# Patient Record
Sex: FEMALE | Race: WHITE | Employment: UNEMPLOYED | URBAN - METROPOLITAN AREA
[De-identification: names, ages, dates, MRNs, and addresses within clinical notes are randomized per-mention and may not be internally consistent; named-entity substitution may affect disease eponyms.]

---

## 2017-01-05 NOTE — TELEPHONE ENCOUNTER
Pt requesting refill, okay to  at either location. HYDROcodone-acetaminophen 5-325 MG Oral Tab Take 1 tablet by mouth every 8 (eight) hours as needed for Pain.  Disp: 60 tablet Rfl: 0

## 2017-01-09 RX ORDER — HYDROCODONE BITARTRATE AND ACETAMINOPHEN 5; 325 MG/1; MG/1
1 TABLET ORAL EVERY 8 HOURS PRN
Qty: 60 TABLET | Refills: 0 | Status: SHIPPED | OUTPATIENT
Start: 2017-01-09 | End: 2017-02-06

## 2017-01-09 NOTE — TELEPHONE ENCOUNTER
Phone call to patient.  Voice message left that script for Hydrocodone is ready for p/u at [address] placed in file [place] at [facility].

## 2017-02-03 NOTE — TELEPHONE ENCOUNTER
Pt requesting refill      Current Outpatient Prescriptions:  HYDROcodone-acetaminophen 5-325 MG Oral Tab Take 1 tablet by mouth every 8 (eight) hours as needed for Pain.  Disp: 60 tablet Rfl: 0

## 2017-02-06 RX ORDER — HYDROCODONE BITARTRATE AND ACETAMINOPHEN 5; 325 MG/1; MG/1
1 TABLET ORAL EVERY 8 HOURS PRN
Qty: 60 TABLET | Refills: 0 | Status: SHIPPED | OUTPATIENT
Start: 2017-02-06 | End: 2017-03-07

## 2017-02-06 NOTE — TELEPHONE ENCOUNTER
Please advise on refill request.     Recent Visits       Provider Department Primary Dx    3 months ago Yvon Delvalle MD [facility] [place], [facility], [place] Routine general medical examination at a health care facility    1 year ago Jessica [place]

## 2017-03-06 NOTE — TELEPHONE ENCOUNTER
Pt requesting refill for medication.  Please advise      Current outpatient prescriptions:   •  HYDROcodone-acetaminophen 5-325 MG Oral Tab, Take 1 tablet by mouth every 8 (eight) hours as needed for Pain., Disp: 60 tablet, Rfl: 0

## 2017-03-08 RX ORDER — HYDROCODONE BITARTRATE AND ACETAMINOPHEN 5; 325 MG/1; MG/1
1 TABLET ORAL EVERY 8 HOURS PRN
Qty: 60 TABLET | Refills: 0 | OUTPATIENT
Start: 2017-03-08 | End: 2017-03-10

## 2017-03-10 RX ORDER — HYDROCODONE BITARTRATE AND ACETAMINOPHEN 5; 325 MG/1; MG/1
1 TABLET ORAL EVERY 8 HOURS PRN
Qty: 60 TABLET | Refills: 0 | Status: SHIPPED | OUTPATIENT
Start: 2017-03-10 | End: 2017-04-07

## 2017-03-10 NOTE — TELEPHONE ENCOUNTER
Patient called and informed Reida Situ Rx is ready for  at [facility] location in [place] 3rd floor. Patient verbalized understanding with no further questions noted.

## 2017-04-04 NOTE — TELEPHONE ENCOUNTER
Pt requesting refill, stts she can  script at either location.          HYDROcodone-acetaminophen 5-325 MG Oral Tab

## 2017-04-07 ENCOUNTER — TELEPHONE (OUTPATIENT)
Dept: INTERNAL MEDICINE CLINIC | Facility: CLINIC | Age: 32
End: 2017-04-07

## 2017-04-07 RX ORDER — HYDROCODONE BITARTRATE AND ACETAMINOPHEN 5; 325 MG/1; MG/1
1 TABLET ORAL EVERY 8 HOURS PRN
Qty: 60 TABLET | Refills: 0 | Status: SHIPPED | OUTPATIENT
Start: 2017-04-07 | End: 2017-05-10

## 2017-04-07 NOTE — TELEPHONE ENCOUNTER
Left message that script is ready to be picked up at [address] office third floor. Placed at .

## 2017-04-07 NOTE — TELEPHONE ENCOUNTER
Dr. Luisa Hsu, Rx request for Hydrocodone-acetaminophen 5-325 mg, UNABLE to fill per protocol. Last refill: 3/10/17 #60 with 0 refills. Please review. Thank you.     Refill Protocol Appointment Criteria  · Appointment scheduled in the past 6 months or in the

## 2017-05-03 NOTE — TELEPHONE ENCOUNTER
Pt requesting refill.   stts can  script at either location. HYDROcodone-acetaminophen 5-325 MG Oral Tab Take 1 tablet by mouth every 8 (eight) hours as needed for Pain.  Disp: 60 tablet Rfl: 0

## 2017-05-08 NOTE — TELEPHONE ENCOUNTER
Pt is calling for status of her medication refill request. Pt states that she is out of medication and can be reached at [phone].

## 2017-05-10 NOTE — TELEPHONE ENCOUNTER
Dr. Shasha Maciel, please see pended medication and advise. Pt is out of medication.     LOV: 11/8/16  Last prescribed: 4/7/17 for 60 tabs/0 refills

## 2017-05-12 RX ORDER — HYDROCODONE BITARTRATE AND ACETAMINOPHEN 5; 325 MG/1; MG/1
1 TABLET ORAL EVERY 8 HOURS PRN
Qty: 60 TABLET | Refills: 0 | Status: SHIPPED | OUTPATIENT
Start: 2017-05-12 | End: 2017-06-05

## 2017-05-12 NOTE — TELEPHONE ENCOUNTER
Patient called and informed Rx is ready for  at [facility] location in [place] at 3rd floor . Patient verbalized understanding with no further questions noted.

## 2017-06-05 RX ORDER — HYDROCODONE BITARTRATE AND ACETAMINOPHEN 5; 325 MG/1; MG/1
1 TABLET ORAL EVERY 8 HOURS PRN
Qty: 60 TABLET | Refills: 0 | Status: SHIPPED | OUTPATIENT
Start: 2017-06-05 | End: 2017-07-06

## 2017-07-06 NOTE — TELEPHONE ENCOUNTER
Pt requesting refill. Pt stts she can  script at either location. HYDROcodone-acetaminophen 5-325 MG Oral Tab Take 1 tablet by mouth every 8 (eight) hours as needed for Pain.  Disp: 60 tablet Rfl: 0

## 2017-07-07 RX ORDER — HYDROCODONE BITARTRATE AND ACETAMINOPHEN 5; 325 MG/1; MG/1
1 TABLET ORAL EVERY 8 HOURS PRN
Qty: 60 TABLET | Refills: 0 | Status: SHIPPED | OUTPATIENT
Start: 2017-07-07 | End: 2017-08-07

## 2017-07-07 NOTE — TELEPHONE ENCOUNTER
Spoke to Pt , informed Norco prescription is ready at the  . [facility] location .

## 2017-08-10 RX ORDER — HYDROCODONE BITARTRATE AND ACETAMINOPHEN 5; 325 MG/1; MG/1
1 TABLET ORAL EVERY 8 HOURS PRN
Qty: 60 TABLET | Refills: 0 | Status: SHIPPED | OUTPATIENT
Start: 2017-08-10 | End: 2017-09-07

## 2017-08-10 NOTE — TELEPHONE ENCOUNTER
Spoke to Pt informed [place] rx is ready at the [facility]. Pt verbalized understanding denied questions .

## 2017-09-05 NOTE — TELEPHONE ENCOUNTER
Pt would like a refill on hydrocodone medication. Please, call pt when the script is ready for pickup at either location.        Current Outpatient Prescriptions:  HYDROcodone-acetaminophen 5-325 MG Oral Tab Take 1 tablet by mouth every 8 (eight) hours as n

## 2017-09-07 RX ORDER — HYDROCODONE BITARTRATE AND ACETAMINOPHEN 5; 325 MG/1; MG/1
1 TABLET ORAL EVERY 8 HOURS PRN
Qty: 60 TABLET | Refills: 0 | Status: SHIPPED | OUTPATIENT
Start: 2017-09-07 | End: 2017-10-06

## 2017-09-07 NOTE — TELEPHONE ENCOUNTER
Requesting Hydrocodone-acetaminophen refill    Last filled 8/10/17  Last OV 11/8/16    Med pended for review, please advise

## 2017-10-06 RX ORDER — HYDROCODONE BITARTRATE AND ACETAMINOPHEN 5; 325 MG/1; MG/1
1 TABLET ORAL EVERY 8 HOURS PRN
Qty: 60 TABLET | Refills: 0 | Status: SHIPPED | OUTPATIENT
Start: 2017-10-06 | End: 2017-10-07

## 2017-10-06 NOTE — TELEPHONE ENCOUNTER
Pt stts she is out of medication, pt stts she can  script at either location.      Pt stts she is trying to find an new PCP due to insurance,         Current Outpatient Prescriptions:  HYDROcodone-acetaminophen 5-325 MG Oral Tab Take 1 tablet by mout

## 2017-10-06 NOTE — TELEPHONE ENCOUNTER
Dr. Kevin Echeverria, please see pended medication and advise.      LOV: 11/8/16  Last prescribed: 9/7/17 for 60 tabs/0 refills

## 2017-10-07 NOTE — TELEPHONE ENCOUNTER
Please sign and print when you are at [address]. Rx was printed at [facility] by accident. Rx pended.

## 2017-10-10 RX ORDER — HYDROCODONE BITARTRATE AND ACETAMINOPHEN 5; 325 MG/1; MG/1
1 TABLET ORAL EVERY 8 HOURS PRN
Qty: 60 TABLET | Refills: 0 | Status: SHIPPED | OUTPATIENT
Start: 2017-10-10 | End: 2017-12-22

## 2017-12-21 NOTE — TELEPHONE ENCOUNTER
Pt calling states she has been unable to switch her care as of yet to new PCP due to having illinicare. Pt requesting refill, pt is out of meds. Please advise.          Current Outpatient Prescriptions:  HYDROcodone-acetaminophen 5-325 MG Oral Tab Take 1 ta

## 2017-12-22 RX ORDER — HYDROCODONE BITARTRATE AND ACETAMINOPHEN 5; 325 MG/1; MG/1
1 TABLET ORAL EVERY 8 HOURS PRN
Qty: 60 TABLET | Refills: 0 | Status: SHIPPED | OUTPATIENT
Start: 2017-12-22 | End: 2017-12-29

## 2017-12-28 NOTE — TELEPHONE ENCOUNTER
Pt called to follow up. Script was approved on 12/22  Is script ready for ? Which location? Please advise.

## 2017-12-29 RX ORDER — HYDROCODONE BITARTRATE AND ACETAMINOPHEN 5; 325 MG/1; MG/1
1 TABLET ORAL EVERY 8 HOURS PRN
Qty: 60 TABLET | Refills: 0 | Status: SHIPPED | OUTPATIENT
Start: 2017-12-29 | End: 2018-04-04

## 2017-12-29 NOTE — TELEPHONE ENCOUNTER
Rx signed by Dr Miguelina Menendez . Called Pt to inform is at the [facility] location . Pt has [employer] insurance will find PCP to continue to fill meds .

## 2017-12-29 NOTE — TELEPHONE ENCOUNTER
Pt called last night stating her rx was ready for  but she did not know where to get her rx. Pt called today to follow up.     -called on sight nurse at [facility], rn stated that the rx was not there and Dr. Shruthi Aiken  On vacation.  Send an encounter.     - molina

## 2018-02-23 NOTE — TELEPHONE ENCOUNTER
Pt requesting refill for     Current Outpatient Prescriptions:  HYDROcodone-acetaminophen 5-325 MG Oral Tab Take 1 tablet by mouth every 8 (eight) hours as needed for Pain.  Disp: 60 tablet Rfl: 0       Pt states she still has not found a new pcp who takes her insurance, Please advise thank you

## 2018-02-27 NOTE — TELEPHONE ENCOUNTER
I inquired if pt was assisted by insurance to find another PCP. She states that she hasn't found one yet that will be able to take care of all of her needs. Pt states that Dr. Zaynab Daugherty did refill this rx once before since her insurance changed. Pt knows she would have to pay for this rx out of pocket. Please advise.

## 2018-02-28 RX ORDER — HYDROCODONE BITARTRATE AND ACETAMINOPHEN 5; 325 MG/1; MG/1
1 TABLET ORAL EVERY 8 HOURS PRN
Qty: 60 TABLET | Refills: 0 | OUTPATIENT
Start: 2018-02-28

## 2018-03-01 RX ORDER — HYDROCODONE BITARTRATE AND ACETAMINOPHEN 5; 325 MG/1; MG/1
1 TABLET ORAL EVERY 8 HOURS PRN
Qty: 60 TABLET | Refills: 0 | OUTPATIENT
Start: 2018-03-01

## 2018-03-02 RX ORDER — HYDROCODONE BITARTRATE AND ACETAMINOPHEN 5; 325 MG/1; MG/1
1 TABLET ORAL EVERY 8 HOURS PRN
Qty: 60 TABLET | Refills: 0 | OUTPATIENT
Start: 2018-03-02

## 2018-03-02 NOTE — TELEPHONE ENCOUNTER
Patient is calling back to update her insurance information - will be in effect 3/31/18    Patient is asking if you could fill Rx x 1 until she is able to come and see you

## 2018-03-02 NOTE — TELEPHONE ENCOUNTER
FYI: pt called, told pt to make appt due to [address] was 2016, pt understood, she will call back.

## 2018-04-04 ENCOUNTER — OFFICE VISIT (OUTPATIENT)
Dept: INTERNAL MEDICINE CLINIC | Facility: CLINIC | Age: 33
End: 2018-04-04

## 2018-04-04 VITALS
WEIGHT: 140 LBS | HEIGHT: 67 IN | DIASTOLIC BLOOD PRESSURE: 77 MMHG | HEART RATE: 105 BPM | BODY MASS INDEX: 21.97 KG/M2 | TEMPERATURE: 99 F | SYSTOLIC BLOOD PRESSURE: 123 MMHG

## 2018-04-04 DIAGNOSIS — Z00.00 ROUTINE GENERAL MEDICAL EXAMINATION AT A HEALTH CARE FACILITY: Primary | ICD-10-CM

## 2018-04-04 DIAGNOSIS — M54.31 SCIATICA OF RIGHT SIDE: ICD-10-CM

## 2018-04-04 PROCEDURE — 99395 PREV VISIT EST AGE 18-39: CPT | Performed by: INTERNAL MEDICINE

## 2018-04-04 RX ORDER — HYDROCODONE BITARTRATE AND ACETAMINOPHEN 5; 325 MG/1; MG/1
1 TABLET ORAL EVERY 8 HOURS PRN
Qty: 60 TABLET | Refills: 0 | Status: ON HOLD | OUTPATIENT
Start: 2018-04-04 | End: 2019-04-09

## 2018-04-05 NOTE — PROGRESS NOTES
HPI:    Patient ID: Gina Rubin is a 27year old female.     HPI    Physical exam    onging dental procedurel   Diffuse dental carries missing teeth  Planned gradual  Tooth extraction  And dentures with   Posts for support dental implran    chornic low Allergies:  Methocarbamol           Bleeding    HISTORY:  Past Medical History:   Diagnosis Date   • Abnormal Pap smear of cervix 2007    LGSIL    • Migraine headache 1995    OTC meds   • Pregnancy 2002, 2002   • Pregnancy 2012    Vaginal vacuum   • Se Comments: . Breast exam.  Bilateral symmetric  No discrete palpable masses or tenderness  No nipple discharge  And no axillary adenopathy. Musculoskeletal: She exhibits no edema or tenderness. Lymphadenopathy:     She has no cervical adenopathy.    Ella

## 2018-10-30 ENCOUNTER — OFFICE VISIT (OUTPATIENT)
Dept: INTERNAL MEDICINE CLINIC | Facility: CLINIC | Age: 33
End: 2018-10-30
Payer: MEDICAID

## 2018-10-30 VITALS
HEIGHT: 67 IN | TEMPERATURE: 99 F | BODY MASS INDEX: 24.01 KG/M2 | HEART RATE: 98 BPM | WEIGHT: 153 LBS | SYSTOLIC BLOOD PRESSURE: 126 MMHG | DIASTOLIC BLOOD PRESSURE: 77 MMHG

## 2018-10-30 DIAGNOSIS — Z34.91 PREGNANT AND NOT YET DELIVERED IN FIRST TRIMESTER: ICD-10-CM

## 2018-10-30 DIAGNOSIS — N92.6 IRREGULAR PERIODS: Primary | ICD-10-CM

## 2018-10-30 DIAGNOSIS — K02.9 DENTAL CARIES: ICD-10-CM

## 2018-10-30 DIAGNOSIS — Z72.0 TOBACCO ABUSE: ICD-10-CM

## 2018-10-30 PROCEDURE — 90471 IMMUNIZATION ADMIN: CPT | Performed by: INTERNAL MEDICINE

## 2018-10-30 PROCEDURE — 90686 IIV4 VACC NO PRSV 0.5 ML IM: CPT | Performed by: INTERNAL MEDICINE

## 2018-10-30 PROCEDURE — 99212 OFFICE O/P EST SF 10 MIN: CPT | Performed by: INTERNAL MEDICINE

## 2018-10-30 PROCEDURE — 99214 OFFICE O/P EST MOD 30 MIN: CPT | Performed by: INTERNAL MEDICINE

## 2018-10-30 PROCEDURE — 81025 URINE PREGNANCY TEST: CPT | Performed by: INTERNAL MEDICINE

## 2018-10-30 RX ORDER — ACETAMINOPHEN, ASPIRIN AND CAFFEINE 250; 250; 65 MG/1; MG/1; MG/1
1 TABLET, FILM COATED ORAL EVERY 6 HOURS PRN
Status: ON HOLD | COMMUNITY
End: 2019-04-09

## 2018-10-30 RX ORDER — OMEGA-3 FATTY ACIDS/FISH OIL 300-1000MG
CAPSULE ORAL
Status: ON HOLD | COMMUNITY
End: 2019-04-09

## 2018-10-30 RX ORDER — DIPHENHYDRAMINE HCL 25 MG
25 TABLET ORAL EVERY 6 HOURS PRN
Status: ON HOLD | COMMUNITY
End: 2019-04-09

## 2018-10-30 NOTE — PROGRESS NOTES
HPI:    Patient ID: Alphonso Mijares is a 27year old female.     HPI    Patient is here for follow-up she wants to confirm her pregnancy last menstrual period was July 2018  She has dental caries upper and lower teeth and all of them needs extractions is pl Vaginal vacuum   • Seasonal allergies     Clariton   • Varicella zoster 1988      Past Surgical History:   Procedure Laterality Date   • COLONOSCOPY,BIOPSY  2007    LGSIL + HPV      Family History   Problem Relation Age of Onset   • Hypertension Brother 25 (N92.6) Irregular periods  (primary encounter diagnosis)  Plan: URINE PREGNANCY TEST, HCG, BETA SUBUNIT, QUAL        Pt sure she is pregnant  Referral given    (Z34.91) Pregnant and not yet delivered in first trimester  Plan: OBG - INTERNAL        As abo

## 2019-04-05 ENCOUNTER — TELEPHONE (OUTPATIENT)
Dept: OBGYN CLINIC | Facility: CLINIC | Age: 34
End: 2019-04-05

## 2019-04-05 NOTE — TELEPHONE ENCOUNTER
Pt's LMP was 7/19/18. Based on this, pt is 35w3d. Pt has not had any prenatal care. She states she did not have any insurance and could not pay for the appts. She states she did see her pcp in 10/2018, but that is the only appt she has had.   Pt informe

## 2019-04-07 ENCOUNTER — ANESTHESIA (OUTPATIENT)
Dept: OBGYN UNIT | Facility: HOSPITAL | Age: 34
End: 2019-04-07
Payer: MEDICAID

## 2019-04-07 ENCOUNTER — APPOINTMENT (OUTPATIENT)
Dept: ULTRASOUND IMAGING | Facility: HOSPITAL | Age: 34
End: 2019-04-07
Attending: OBSTETRICS & GYNECOLOGY
Payer: MEDICAID

## 2019-04-07 ENCOUNTER — HOSPITAL ENCOUNTER (INPATIENT)
Facility: HOSPITAL | Age: 34
LOS: 2 days | Discharge: HOME | End: 2019-04-09
Attending: OBSTETRICS & GYNECOLOGY | Admitting: OBSTETRICS & GYNECOLOGY
Payer: MEDICAID

## 2019-04-07 ENCOUNTER — ANESTHESIA EVENT (OUTPATIENT)
Dept: OBGYN UNIT | Facility: HOSPITAL | Age: 34
End: 2019-04-07
Payer: MEDICAID

## 2019-04-07 PROBLEM — Z34.90 PREGNANCY: Status: ACTIVE | Noted: 2019-04-07

## 2019-04-07 PROBLEM — O09.33 NO PRENATAL CARE IN CURRENT PREGNANCY IN THIRD TRIMESTER: Status: ACTIVE | Noted: 2019-04-07

## 2019-04-07 PROCEDURE — 59409 OBSTETRICAL CARE: CPT | Performed by: OBSTETRICS & GYNECOLOGY

## 2019-04-07 PROCEDURE — 76816 OB US FOLLOW-UP PER FETUS: CPT | Performed by: OBSTETRICS & GYNECOLOGY

## 2019-04-07 PROCEDURE — 59025 FETAL NON-STRESS TEST: CPT | Performed by: OBSTETRICS & GYNECOLOGY

## 2019-04-07 RX ORDER — EPHEDRINE SULFATE/0.9% NACL/PF 25 MG/5 ML
5 SYRINGE (ML) INTRAVENOUS AS NEEDED
Status: DISCONTINUED | OUTPATIENT
Start: 2019-04-07 | End: 2019-04-09

## 2019-04-07 RX ORDER — TERBUTALINE SULFATE 1 MG/ML
0.25 INJECTION, SOLUTION SUBCUTANEOUS AS NEEDED
Status: DISCONTINUED | OUTPATIENT
Start: 2019-04-07 | End: 2019-04-08

## 2019-04-07 RX ORDER — METHYLERGONOVINE MALEATE 0.2 MG/ML
INJECTION INTRAVENOUS
Status: COMPLETED
Start: 2019-04-07 | End: 2019-04-07

## 2019-04-07 RX ORDER — DEXTROSE, SODIUM CHLORIDE, SODIUM LACTATE, POTASSIUM CHLORIDE, AND CALCIUM CHLORIDE 5; .6; .31; .03; .02 G/100ML; G/100ML; G/100ML; G/100ML; G/100ML
INJECTION, SOLUTION INTRAVENOUS CONTINUOUS
Status: DISCONTINUED | OUTPATIENT
Start: 2019-04-07 | End: 2019-04-08

## 2019-04-07 RX ORDER — CARBOPROST TROMETHAMINE 250 UG/ML
INJECTION, SOLUTION INTRAMUSCULAR
Status: DISCONTINUED
Start: 2019-04-07 | End: 2019-04-07 | Stop reason: WASHOUT

## 2019-04-07 RX ORDER — AMMONIA INHALANTS 0.04 G/.3ML
0.3 INHALANT RESPIRATORY (INHALATION) AS NEEDED
Status: DISCONTINUED | OUTPATIENT
Start: 2019-04-07 | End: 2019-04-08

## 2019-04-07 RX ORDER — MISOPROSTOL 200 UG/1
TABLET ORAL
Status: COMPLETED
Start: 2019-04-07 | End: 2019-04-07

## 2019-04-07 RX ORDER — BUPIVACAINE HYDROCHLORIDE 2.5 MG/ML
INJECTION, SOLUTION EPIDURAL; INFILTRATION; INTRACAUDAL
Status: DISPENSED
Start: 2019-04-07 | End: 2019-04-08

## 2019-04-07 RX ORDER — BUPIVACAINE HYDROCHLORIDE 2.5 MG/ML
15 INJECTION, SOLUTION EPIDURAL; INFILTRATION; INTRACAUDAL ONCE
Status: COMPLETED | OUTPATIENT
Start: 2019-04-07 | End: 2019-04-07

## 2019-04-07 RX ORDER — NALBUPHINE HCL 10 MG/ML
2.5 AMPUL (ML) INJECTION
Status: DISCONTINUED | OUTPATIENT
Start: 2019-04-07 | End: 2019-04-09

## 2019-04-07 RX ORDER — IBUPROFEN 600 MG/1
600 TABLET ORAL ONCE AS NEEDED
Status: DISCONTINUED | OUTPATIENT
Start: 2019-04-07 | End: 2019-04-09

## 2019-04-07 RX ORDER — SODIUM CHLORIDE, SODIUM LACTATE, POTASSIUM CHLORIDE, CALCIUM CHLORIDE 600; 310; 30; 20 MG/100ML; MG/100ML; MG/100ML; MG/100ML
INJECTION, SOLUTION INTRAVENOUS CONTINUOUS
Status: DISCONTINUED | OUTPATIENT
Start: 2019-04-07 | End: 2019-04-08

## 2019-04-07 RX ORDER — LIDOCAINE HYDROCHLORIDE 10 MG/ML
30 INJECTION, SOLUTION EPIDURAL; INFILTRATION; INTRACAUDAL; PERINEURAL ONCE
Status: DISCONTINUED | OUTPATIENT
Start: 2019-04-07 | End: 2019-04-08

## 2019-04-07 RX ORDER — LIDOCAINE HYDROCHLORIDE AND EPINEPHRINE 15; 5 MG/ML; UG/ML
INJECTION, SOLUTION EPIDURAL AS NEEDED
Status: DISCONTINUED | OUTPATIENT
Start: 2019-04-07 | End: 2019-04-07 | Stop reason: SURG

## 2019-04-07 RX ORDER — SODIUM CHLORIDE 0.9 % (FLUSH) 0.9 %
10 SYRINGE (ML) INJECTION AS NEEDED
Status: DISCONTINUED | OUTPATIENT
Start: 2019-04-07 | End: 2019-04-08

## 2019-04-07 RX ORDER — TRISODIUM CITRATE DIHYDRATE AND CITRIC ACID MONOHYDRATE 500; 334 MG/5ML; MG/5ML
30 SOLUTION ORAL AS NEEDED
Status: DISCONTINUED | OUTPATIENT
Start: 2019-04-07 | End: 2019-04-08

## 2019-04-07 RX ORDER — LIDOCAINE HYDROCHLORIDE 10 MG/ML
INJECTION, SOLUTION EPIDURAL; INFILTRATION; INTRACAUDAL; PERINEURAL AS NEEDED
Status: DISCONTINUED | OUTPATIENT
Start: 2019-04-07 | End: 2019-04-07 | Stop reason: SURG

## 2019-04-07 RX ADMIN — LIDOCAINE HYDROCHLORIDE AND EPINEPHRINE 5 ML: 15; 5 INJECTION, SOLUTION EPIDURAL at 17:15:00

## 2019-04-07 RX ADMIN — BUPIVACAINE HYDROCHLORIDE 10 ML: 2.5 INJECTION, SOLUTION EPIDURAL; INFILTRATION; INTRACAUDAL at 17:17:00

## 2019-04-07 RX ADMIN — LIDOCAINE HYDROCHLORIDE 3 ML: 10 INJECTION, SOLUTION EPIDURAL; INFILTRATION; INTRACAUDAL; PERINEURAL at 17:12:00

## 2019-04-07 NOTE — PROGRESS NOTES
Pt is a 26year old female admitted to TR1/TR1-A. Patient presents with:  Rupture Of Membranes: srom 1230, clear mucousy     Pt is  38w1d intra-uterine pregnancy. History obtained, consents signed. Oriented to room, staff, and plan of care.

## 2019-04-07 NOTE — ANESTHESIA PREPROCEDURE EVALUATION
Anesthesia PreOp Note    HPI:     Lorraine Christian is a 26year old female who presents for preoperative consultation requested by: * No surgeons listed *    Date of Surgery: 4/7/2019    * No procedures listed *  Indication: * No pre-op diagnosis entered * 5 % /lactated ringers infusion  Intravenous Continuous Hilton Jauregui MD     Lidocaine HCl (PF) (XYLOCAINE) 1 % injection SOLN 30 mL 30 mL Intradermal Once Marquis Overton MD     ibuprofen (MOTRIN) tab 600 mg 600 mg Oral Once PRN Jessica Overton outpatient medications on file.       Methocarbamol           BLEEDING    Family History   Problem Relation Age of Onset   • Hypertension Brother 25   • Hypertension Maternal Uncle    • Heart Disease Maternal Grandfather         CAD   • Other (Brain Aneurys 1 cup coffee daily        Occupational Exposure: Not Asked        Hobby Hazards: Not Asked        Sleep Concern: Not Asked        Stress Concern: Not Asked        Weight Concern: Not Asked        Special Diet: Not Asked        Back Care: Not Asked relevant, major complications, and any alternative forms of anesthetic management. All of the patient's questions were answered to the best of my ability. The patient desires the anesthetic management as planned.   I have informed Wallace Yan  of the

## 2019-04-07 NOTE — ANESTHESIA PROCEDURE NOTES
Labor Analgesia  Performed by: Evaristo Solano MD  Authorized by: Evaristo Solano MD     Patient Location:  OB  Start Time:  4/7/2019 5:07 PM  End Time:  4/7/2019 5:20 PM  Site Identification: surface landmarks    Reason for Block: labor epidural

## 2019-04-07 NOTE — H&P
[facility] Patient Status:  Inpatient    1985 MRN [id number]   Location [address]  Attending Marifer Cheema MD   Hosp Day # 0 PCP Yoly Roman MD     Date BLEEDING  Medications:    Medications Prior to Admission:  Prenatal Vit-Fe Sulfate-FA-DHA (PRENATAL+DHA OR) Take by mouth.  Disp:  Rfl:  4/7/2019 at Unknown time   aspirin-acetaminophen-caffeine [facility] MENSTRUAL COMPLETE) 250-250-65 MG Oral Tab Take 1 ta SPECGRAVITY 1.009 03/06/2013    PROUR NEG 03/06/2013    GLUUR NEG 03/06/2013    KETUR NEG 03/06/2013    BILUR NEG 03/06/2013    BLOODURINE NEG 03/06/2013    NITRITE NEG 03/06/2013    UROBILINOGEN <2.0 03/06/2013    JENNA LARSEN (A) 03/06/2013        Preg Joann

## 2019-04-08 PROCEDURE — 99231 SBSQ HOSP IP/OBS SF/LOW 25: CPT | Performed by: OBSTETRICS & GYNECOLOGY

## 2019-04-08 RX ORDER — SODIUM CHLORIDE 0.9 % (FLUSH) 0.9 %
10 SYRINGE (ML) INJECTION AS NEEDED
Status: DISCONTINUED | OUTPATIENT
Start: 2019-04-08 | End: 2019-04-09

## 2019-04-08 RX ORDER — ACETAMINOPHEN 325 MG/1
650 TABLET ORAL EVERY 6 HOURS PRN
Status: DISCONTINUED | OUTPATIENT
Start: 2019-04-08 | End: 2019-04-09

## 2019-04-08 RX ORDER — AMMONIA INHALANTS 0.04 G/.3ML
0.3 INHALANT RESPIRATORY (INHALATION) AS NEEDED
Status: DISCONTINUED | OUTPATIENT
Start: 2019-04-08 | End: 2019-04-09

## 2019-04-08 RX ORDER — IBUPROFEN 600 MG/1
600 TABLET ORAL EVERY 6 HOURS
Status: DISCONTINUED | OUTPATIENT
Start: 2019-04-08 | End: 2019-04-09

## 2019-04-08 NOTE — CM/SW NOTE
MD order received regarding discharge planning no prenatal care. AMINA met with the pt. And her mother and grandmother at bedside. The pt. Lives with her friend Alecia Pagan and is planning to return there after discharge. The pt.  And baby are interacting

## 2019-04-08 NOTE — LACTATION NOTE
This note was copied from a baby's chart.   LACTATION NOTE - INFANT    Evaluation Type  Evaluation Type: Inpatient    Problems & Assessment  Problems Diagnosed or Identified: Premature  Infant Assessment: Hunger cues present;Skin color: pink or appropriate

## 2019-04-08 NOTE — PROGRESS NOTES
[facility]    OB/GYNE Progress Note      Adeline Zavala Patient Status:  Inpatient    1985 MRN [id number]   Location [facility] 3SE Attending Lily Handy MD   Hosp Day # 1 PCP Ayah Billings MD        Assessment/Plan     P 04/07/2019    ALB 2.8 (L) 04/07/2019    ALKPHO 208 (H) 04/07/2019    BILT 0.1 04/07/2019    TP 6.8 04/07/2019    AST 11 (L) 04/07/2019    ALT 16 04/07/2019    T4F 0.82 03/06/2013    TSH 0.88 03/06/2013       Lab Results   Component Value Date    RPR Non-Re

## 2019-04-08 NOTE — ANESTHESIA POSTPROCEDURE EVALUATION
Patient: Leontine Boxer    Procedure Summary     Date:  04/07/19 Room / Location:      Anesthesia Start:  [facility] Anesthesia Stop:  4112    Procedure:  LABOR ANALGESIA Diagnosis:      Scheduled Providers:   Anesthesiologist:  MD Wilfred Pablo

## 2019-04-08 NOTE — PLAN OF CARE
Problem: ANXIETY  Goal: Will report anxiety at manageable levels  Description  INTERVENTIONS:  - Administer medication as ordered  - Teach and rehearse alternative coping skills  - Provide emotional support with 1:1 interaction with staff  Outcome: Progr baths for perineum discomfort. - Monitor healing of incision/episiotomy/laceration, and assess for signs and symptoms of infection and hematoma.   - Assess bladder function and monitor for bladder distention.  - Provide/instruct/assist with pericare as nee assistance, as needed. - Encourage rooming-in and breast feeding on demand.  - Encourage skin-to-skin contact. - Provide  support as needed. - Assess for and manage engorgement.   - Provide breast feeding education handouts and information on community

## 2019-04-08 NOTE — L&D DELIVERY NOTE
[facility] - [place]    Vaginal Delivery Note    Marco Reyes Patient Status:  Inpatient    1985 MRN [id number]   Location [address]  Attending Deonna Chun MD   Hosp Day # 0 PCP Gi Corona MD     Delivery

## 2019-04-08 NOTE — LACTATION NOTE
LACTATION NOTE - MOTHER      Evaluation Type: Inpatient    Problems identified  Problems identified: Knowledge deficit  Problems Identified Other: late prenatal care    Maternal history  Maternal history: AMA  Other/comment: Smokes 1 pack of cigarettes per

## 2019-04-08 NOTE — PROGRESS NOTES
Patient ambulate to  with assistance but was unable to void. Leonel care performed and new leonel pads and underpants placed. Patient transferred to [facility] and was taken to post partum . Report given to tyson dunaway.

## 2019-04-09 VITALS
HEIGHT: 67 IN | HEART RATE: 73 BPM | OXYGEN SATURATION: 100 % | SYSTOLIC BLOOD PRESSURE: 121 MMHG | RESPIRATION RATE: 16 BRPM | DIASTOLIC BLOOD PRESSURE: 71 MMHG | WEIGHT: 156 LBS | BODY MASS INDEX: 24.48 KG/M2 | TEMPERATURE: 98 F

## 2019-04-09 PROCEDURE — 99238 HOSP IP/OBS DSCHRG MGMT 30/<: CPT | Performed by: OBSTETRICS & GYNECOLOGY

## 2019-04-09 RX ORDER — IBUPROFEN 600 MG/1
600 TABLET ORAL EVERY 6 HOURS PRN
Qty: 20 TABLET | Refills: 0 | Status: SHIPPED | OUTPATIENT
Start: 2019-04-09

## 2019-04-09 NOTE — DISCHARGE SUMMARY
[facility] - [facility]    Discharge Summary    Librado Johnston Patient Status:  Inpatient    1985 MRN [id number]   Location [facility] 3SE Attending Luciana Porter MD   [address] Day # 2       Primary OB Clinician: None    EDC: Estimated Date

## 2019-04-09 NOTE — PLAN OF CARE
Problem: PAIN - ADULT  Goal: Verbalizes/displays adequate comfort level or patient's stated pain goal  Description  INTERVENTIONS:  - Encourage pt to monitor pain and request assistance  - Assess pain using appropriate pain scale  - Administer analgesics family perspectives and choices  Outcome: Progressing     Problem: GENITOURINARY - ADULT  Goal: Absence of urinary retention  Description  INTERVENTIONS:  - Assess patient?s ability to void and empty bladder  - Monitor intake/output and perform bladder sca fingers/hand) versus late cue of crying.  - Discuss/demonstrate breast feeding aids (e.g., infant sling, nursing footstool/pillows, and breast pumps). - Encourage mother/other family members to express feelings/concerns, and actively listen.   - Educate fa answered all questions. VSS. Breastfeeding on demand. Breastfeeding successfully. Voiding independently. Lochia is WNL. Uterus is firm. Bonding appropriately with infant.

## 2019-04-09 NOTE — PROGRESS NOTES
[facility]    OB/GYNE Progress Note      Lennie Angeles Patient Status:  Inpatient    1985 MRN [id number]   Location [facility] 3SE Attending Graciela Garcia MD   Hosp Day # 2 PCP Pancho Farr MD        Assessment/Plan     P 04/07/2019    GLU 86 04/07/2019    CA 8.8 04/07/2019    ALB 2.8 (L) 04/07/2019    ALKPHO 208 (H) 04/07/2019    BILT 0.1 04/07/2019    TP 6.8 04/07/2019    AST 11 (L) 04/07/2019    ALT 16 04/07/2019    T4F 0.82 03/06/2013    TSH 0.88 03/06/2013       Lab Re

## 2019-04-09 NOTE — PROGRESS NOTES
DISCHARGE ORDER RECEIVED FROM MD.     DISCHARGE SHEET COMPLETED AND AVS GIVEN TO MOTHER. ID BANDS MATCHED WITH MOTHER'S BAND. HUGS TAG REMOVED. MOTHER INFORMED OF WHEN TO MAKE A FOLLOW-UP APPOINTMENT WITH BABY'S DOCTOR.     MOTHER VERBALIZED Carina Dexter

## 2019-04-09 NOTE — PLAN OF CARE
Problem: PAIN - ADULT  Goal: Verbalizes/displays adequate comfort level or patient's stated pain goal  Description  INTERVENTIONS:  - Encourage pt to monitor pain and request assistance  - Assess pain using appropriate pain scale  - Administer analgesics Goal:Experiences normal postpartum course  Description  INTERVENTIONS:  - Assess and monitor vital signs and lab values. - Assess fundus and lochia. - Provide ice/sitz baths for perineum discomfort.   - Monitor healing of incision/episiotomy/laceration, a assistance with proper latch. - Review techniques for milk expression (breast pumping) and storage of breast milk. Provide pumping equipment/supplies, instructions and assistance, as needed.   - Encourage rooming-in and breast feeding on demand.  Sandy Ormond

## 2019-04-15 NOTE — CM/SW NOTE
SW completing chart review.  Baby's cord blood tissue from 4/7/19, resulting 4/12/19 indicate:  Methadone, Cord, Qual Cutoff 2 ng/g Present    Methadone Metabolite, Cord, Qual Cutoff 1 ng/g Present      The cord blood tissue from 4/7/19 resulting 4/11/19 in

## 2019-04-23 ENCOUNTER — TELEPHONE (OUTPATIENT)
Dept: INTERNAL MEDICINE CLINIC | Facility: CLINIC | Age: 34
End: 2019-04-23

## 2019-04-23 NOTE — TELEPHONE ENCOUNTER
Patient requesting refill for HYDROcodone-acetaminophen 5-325 MG Oral Tab     Patient is out of medication.

## 2019-04-24 NOTE — TELEPHONE ENCOUNTER
Spoke with patient (verified name and ), advised Dr Yesi Ha note and verbalized understanding.      FU OV made on 19, to discuss pain issue and referral.    Note      Patient requesting refill for HYDROcodone-acetaminophen 5-325 MG Oral Tab      P

## 2019-05-03 ENCOUNTER — OFFICE VISIT (OUTPATIENT)
Dept: INTERNAL MEDICINE CLINIC | Facility: CLINIC | Age: 34
End: 2019-05-03
Payer: MEDICAID

## 2019-05-03 VITALS
HEIGHT: 67 IN | SYSTOLIC BLOOD PRESSURE: 156 MMHG | DIASTOLIC BLOOD PRESSURE: 93 MMHG | WEIGHT: 167 LBS | HEART RATE: 96 BPM | TEMPERATURE: 99 F | BODY MASS INDEX: 26.21 KG/M2

## 2019-05-03 DIAGNOSIS — M54.31 SCIATICA OF RIGHT SIDE: Primary | ICD-10-CM

## 2019-05-03 DIAGNOSIS — K02.9 DENTAL CARIES: ICD-10-CM

## 2019-05-03 DIAGNOSIS — R03.0 ELEVATED BP WITHOUT DIAGNOSIS OF HYPERTENSION: ICD-10-CM

## 2019-05-03 DIAGNOSIS — Z72.0 TOBACCO ABUSE: ICD-10-CM

## 2019-05-03 PROCEDURE — 99214 OFFICE O/P EST MOD 30 MIN: CPT | Performed by: INTERNAL MEDICINE

## 2019-05-03 PROCEDURE — 99212 OFFICE O/P EST SF 10 MIN: CPT | Performed by: INTERNAL MEDICINE

## 2019-05-03 RX ORDER — HYDROCODONE BITARTRATE AND ACETAMINOPHEN 5; 325 MG/1; MG/1
1 TABLET ORAL EVERY 8 HOURS PRN
Qty: 60 TABLET | Refills: 0 | OUTPATIENT
Start: 2019-05-03

## 2019-05-03 NOTE — TELEPHONE ENCOUNTER
Pt was seen today forgot to ask for refill on medication   Hydrocodone   Per pt would like enough til she is seen by the pain clinic. Pt states she as no medication.

## 2019-05-03 NOTE — TELEPHONE ENCOUNTER
Please see below and advise    Last Hydrocodone 4/4/18    Med pended for script printed    Please respond to pool: [id number] Albsaurabh [id number] LPN/CMA

## 2019-05-06 NOTE — PROGRESS NOTES
HPI:    Patient ID: Adeline Zavala is a 26year old female. HPI      Low back pain  Taking a care of 1year old child  Pain low back down right leg   Hx of chronic low back pain    .   BP (!) 156/93 (BP Location: Right arm, Patient Position: Sitting, Cu Procedure Laterality Date   • COLONOSCOPY,BIOPSY  2007    LGSIL + HPV      Family History   Problem Relation Age of Onset   • Hypertension Brother 25   • Hypertension Maternal Uncle    • Heart Disease Maternal Grandfather         CAD   • Other (Brain Ane

## 2019-11-06 ENCOUNTER — HOSPITAL ENCOUNTER (OUTPATIENT)
Age: 34
Discharge: HOME | End: 2019-11-06
Attending: EMERGENCY MEDICINE
Payer: MEDICAID

## 2019-11-06 VITALS — BODY MASS INDEX: 23.36 KG/M2 | WEIGHT: 145.38 LBS | HEIGHT: 66 IN

## 2019-11-06 DIAGNOSIS — S46.811A TRAPEZIUS STRAIN, RIGHT, INITIAL ENCOUNTER: Primary | ICD-10-CM

## 2019-11-06 PROCEDURE — 99204 OFFICE O/P NEW MOD 45 MIN: CPT

## 2019-11-06 PROCEDURE — 99213 OFFICE O/P EST LOW 20 MIN: CPT

## 2019-11-06 RX ORDER — IBUPROFEN 600 MG/1
600 TABLET ORAL EVERY 6 HOURS PRN
Qty: 30 TABLET | Refills: 0 | Status: SHIPPED | OUTPATIENT
Start: 2019-11-06 | End: 2019-11-13

## 2019-11-06 RX ORDER — DIAZEPAM 5 MG/1
5 TABLET ORAL EVERY 6 HOURS PRN
Qty: 20 TABLET | Refills: 0 | Status: SHIPPED | OUTPATIENT
Start: 2019-11-06 | End: 2019-11-13

## 2019-11-11 ENCOUNTER — MED REC SCAN ONLY (OUTPATIENT)
Dept: INTERNAL MEDICINE CLINIC | Facility: CLINIC | Age: 34
End: 2019-11-11

## 2019-11-18 NOTE — ED PROVIDER NOTES
Patient Seen in: [facility] [place]      History   No chief complaint on file. Stated Complaint: neck pain    HPI    27 yo female with right sided neck pain. No specific trauma. Pain is worse with movement. No recent illness. MDM                   Disposition and Plan     Clinical Impression:  Trapezius strain, right, initial encounter  (primary encounter diagnosis)    Disposition:  Discharge  11/6/2019 10:42 am    Follow-up:  Deirdre Mendez MD  [address] [place] [address]

## 2020-03-11 ENCOUNTER — MED REC SCAN ONLY (OUTPATIENT)
Dept: INTERNAL MEDICINE CLINIC | Facility: CLINIC | Age: 35
End: 2020-03-11

## 2022-08-13 NOTE — PROGRESS NOTES
Problem: Adult Inpatient Plan of Care  Goal: Plan of Care Review  Outcome: Ongoing, Progressing  Goal: Patient-Specific Goal (Individualized)  Outcome: Ongoing, Progressing  Goal: Absence of Hospital-Acquired Illness or Injury  Outcome: Ongoing, Progressing  Goal: Optimal Comfort and Wellbeing  Outcome: Ongoing, Progressing     Problem: Fall Injury Risk  Goal: Absence of Fall and Fall-Related Injury  Outcome: Ongoing, Progressing     Problem: Skin Injury Risk Increased  Goal: Skin Health and Integrity  Outcome: Ongoing, Progressing     Problem: Infection Progression (Sepsis/Septic Shock)  Goal: Absence of Infection Signs and Symptoms  Outcome: Ongoing, Progressing        POC reviewed with patient. All questions and concerns addressed. Fall/safety precautions implemented and maintained. Urinal provided and within reach. IVF continued. CPAP HS. No acute events noted this shift. Please see flowsheet for full assessment and vitals. Bed locked in lowest position. Call bell within reach. Will continue to monitor.        Report received from Rn.    Received patient in bed post epidural.

## 2023-07-17 ENCOUNTER — HOSPITAL ENCOUNTER (OUTPATIENT)
Age: 38
Discharge: HOME | End: 2023-07-17
Payer: MEDICAID

## 2023-07-17 VITALS
DIASTOLIC BLOOD PRESSURE: 79 MMHG | SYSTOLIC BLOOD PRESSURE: 131 MMHG | RESPIRATION RATE: 18 BRPM | TEMPERATURE: 98 F | HEART RATE: 86 BPM | OXYGEN SATURATION: 100 %

## 2023-07-17 DIAGNOSIS — K04.7 DENTAL ABSCESS: Primary | ICD-10-CM

## 2023-07-17 PROCEDURE — 99213 OFFICE O/P EST LOW 20 MIN: CPT | Performed by: NURSE PRACTITIONER

## 2023-07-17 RX ORDER — AMOXICILLIN AND CLAVULANATE POTASSIUM 875; 125 MG/1; MG/1
1 TABLET, FILM COATED ORAL 2 TIMES DAILY
Qty: 20 TABLET | Refills: 0 | Status: SHIPPED | OUTPATIENT
Start: 2023-07-17 | End: 2023-07-27

## 2023-07-17 RX ORDER — CHLORHEXIDINE GLUCONATE 0.12 MG/ML
15 RINSE ORAL 2 TIMES DAILY
Qty: 473 ML | Refills: 0 | Status: SHIPPED | OUTPATIENT
Start: 2023-07-17

## 2023-07-17 NOTE — DISCHARGE INSTRUCTIONS
Take the antibiotic twice a day until gone. Swish and spit the mouthwash twice a day. Continue ibuprofen as needed for pain. You may also use Tylenol. See a dentist as soon as possible.   Go to the emergency room if worsening symptoms  [address] [phone]

## 2024-05-10 NOTE — LETTER
06/03/19        [address] [facility]  [place] [address]      Dear Brown Alvarez records indicate that you have outstanding lab work and or testing that was ordered for you and has not yet been completed:  Orders Placed This Encounter 10-May-2024

## (undated) NOTE — LETTER
11/29/18        [address] [place] [address] [address] [address]      Dear Hina Bar records indicate that you have outstanding lab work and or testing that was ordered for you and has not yet been completed:  Orders Placed This Encounter

## (undated) NOTE — LETTER
11/14/2018              [address]         Dear Palmer Ricketts,    This letter is to inform you that our office has made several attempts to reach you by phone without success.   We were attempting to c